# Patient Record
Sex: MALE | Race: BLACK OR AFRICAN AMERICAN | NOT HISPANIC OR LATINO | Employment: UNEMPLOYED | URBAN - METROPOLITAN AREA
[De-identification: names, ages, dates, MRNs, and addresses within clinical notes are randomized per-mention and may not be internally consistent; named-entity substitution may affect disease eponyms.]

---

## 2024-01-25 ENCOUNTER — OFFICE VISIT (OUTPATIENT)
Dept: INTERNAL MEDICINE | Facility: CLINIC | Age: 64
End: 2024-01-25
Payer: MEDICAID

## 2024-01-25 VITALS
SYSTOLIC BLOOD PRESSURE: 124 MMHG | HEART RATE: 76 BPM | OXYGEN SATURATION: 99 % | WEIGHT: 158.75 LBS | BODY MASS INDEX: 20.37 KG/M2 | HEIGHT: 74 IN | DIASTOLIC BLOOD PRESSURE: 80 MMHG

## 2024-01-25 DIAGNOSIS — C78.7 COLON CANCER METASTASIZED TO LIVER: ICD-10-CM

## 2024-01-25 DIAGNOSIS — I10 HYPERTENSION, UNSPECIFIED TYPE: ICD-10-CM

## 2024-01-25 DIAGNOSIS — Z00.00 ENCOUNTER FOR ANNUAL PHYSICAL EXAM: ICD-10-CM

## 2024-01-25 DIAGNOSIS — C18.9 COLON CANCER METASTASIZED TO LIVER: ICD-10-CM

## 2024-01-25 DIAGNOSIS — R20.2 PARESTHESIA OF LOWER EXTREMITY: ICD-10-CM

## 2024-01-25 DIAGNOSIS — Z00.00 PREVENTATIVE HEALTH CARE: Primary | ICD-10-CM

## 2024-01-25 DIAGNOSIS — N52.9 ERECTILE DYSFUNCTION, UNSPECIFIED ERECTILE DYSFUNCTION TYPE: ICD-10-CM

## 2024-01-25 PROCEDURE — 3008F BODY MASS INDEX DOCD: CPT | Mod: CPTII,,,

## 2024-01-25 PROCEDURE — 1101F PT FALLS ASSESS-DOCD LE1/YR: CPT | Mod: CPTII,,,

## 2024-01-25 PROCEDURE — 99999 PR PBB SHADOW E&M-NEW PATIENT-LVL IV: CPT | Mod: PBBFAC,,,

## 2024-01-25 PROCEDURE — 1126F AMNT PAIN NOTED NONE PRSNT: CPT | Mod: CPTII,,,

## 2024-01-25 PROCEDURE — 3074F SYST BP LT 130 MM HG: CPT | Mod: CPTII,,,

## 2024-01-25 PROCEDURE — 99204 OFFICE O/P NEW MOD 45 MIN: CPT | Mod: S$PBB,,,

## 2024-01-25 PROCEDURE — 99204 OFFICE O/P NEW MOD 45 MIN: CPT | Mod: PBBFAC

## 2024-01-25 PROCEDURE — 3288F FALL RISK ASSESSMENT DOCD: CPT | Mod: CPTII,,,

## 2024-01-25 PROCEDURE — 3079F DIAST BP 80-89 MM HG: CPT | Mod: CPTII,,,

## 2024-01-25 NOTE — PROGRESS NOTES
"[facility]  CLINIC NOTE    Patient Name: Rishi Matias  YOB: 1960  2024    Subjective:      Chief Complaint: Annual exam  HPI: The patient is a 63 y.o. male with no documented medical history being seen to establish care and for his annual exam.    Previous Oncologist: Dr. Dany Parham in [place], [place].  The patient has a history of stage IV colon cancer which metastasized to his liver and rectum. He underwent chemotherapy and surgery around 2019. He currently has an ostomy. He has not received chemotherapy in a few years. He states that he was lost to follow up and thinks that maybe he was supposed to receive more chemotherapy. Patient went to the [facility] but was told he needs a PCP referral.    The patient reports that since his ostomy surgery he has not had an erection. He denies morning erections. No dysuria or difficulty urinating.    He also reports decreased sensation in his lower extremities since his surgery. He states he has falls a few times per month when his legs "give out" on him. He denies light-headedness, dizziness, or palpitations.    Current smokin-6 cigarettes per day; smoked for 50 years. Patient is not interested in quitting smoking at this time.  Alcohol: None.    HEALTH MAINTENANCE:  Cholesterol/labs, HIV, Hep C: Will Check today  Colon CA screening: Patient with colon cancer, re-referring to oncology.  AAA screening: Will schedule  Lung CA screening: Will schedule    VACCINATIONS: Declining vaccinations at this time.    Review of Systems   Constitutional:  Negative for chills, fever and weight loss.   HENT:  Negative for congestion, hearing loss, sinus pain and sore throat.    Eyes:  Negative for blurred vision and redness.   Respiratory:  Negative for cough, sputum production, shortness of breath and wheezing.    Cardiovascular:  Negative for chest pain, palpitations and leg swelling.   Gastrointestinal:  Negative for abdominal pain, " "constipation, diarrhea, nausea and vomiting.   Genitourinary:  Negative for dysuria and urgency.   Musculoskeletal:  Negative for joint pain and myalgias.   Skin:  Negative for itching and rash.   Neurological:  Negative for sensory change, weakness and headaches.       There is no problem list on file for this patient.        Objective:      /80   Pulse 76   Ht 6' 2" (1.88 m)   Wt 72 kg (158 lb 11.7 oz)   SpO2 99%   BMI 20.38 kg/m²   Estimated body mass index is 20.38 kg/m² as calculated from the following:    Height as of this encounter: 6' 2" (1.88 m).    Weight as of this encounter: 72 kg (158 lb 11.7 oz).  Physical Exam  Constitutional:       General: He is not in acute distress.     Appearance: Normal appearance. He is not ill-appearing.   HENT:      Head: Normocephalic and atraumatic.      Mouth/Throat:      Mouth: Mucous membranes are moist.      Pharynx: Oropharynx is clear.   Eyes:      General: No scleral icterus.     Extraocular Movements: Extraocular movements intact.      Conjunctiva/sclera: Conjunctivae normal.   Cardiovascular:      Rate and Rhythm: Normal rate and regular rhythm.      Heart sounds: No murmur heard.  Pulmonary:      Effort: Pulmonary effort is normal. No respiratory distress.      Breath sounds: Normal breath sounds. No wheezing or rales.   Abdominal:      General: Abdomen is flat. Bowel sounds are normal. There is no distension.      Tenderness: There is no abdominal tenderness. There is no guarding.   Musculoskeletal:      Right lower leg: No edema.      Left lower leg: No edema.   Skin:     General: Skin is warm.      Coloration: Skin is not jaundiced.   Neurological:      Mental Status: He is alert and oriented to person, place, and time.      Sensory: Sensory deficit present.      Motor: Weakness (4/5 bilateral dorsiflexion of feet; otherwise strength intact) present.      Comments: Absent pinprick to bilateral lower extremities to knee   Psychiatric:         Mood and " Affect: Mood normal.         Assessment and Plan:     Preventative health care  Encounter for annual physical exam  63 year old male with history of hypertension and metastatic colon cancer. BP well controlled in office today at 124/80 without any antihypertensives. Will see back in 1 month for recheck to ensure BP well managed off of medications. Instructed patient to monitor BP at home as well and keep a log. Will get patient established with oncology in [place]. Unsure if patient completed his previously planned treatment or if he was lost to follow-up. Additionally, will obtain routine annual exam labs. Will check one time HIV and Hep C. Will check one time AAA screening given smoking history. Will schedule yearly low dose lung screening CT scan.    -     CBC W/ AUTO DIFFERENTIAL; Future; Expected date: 01/25/2024  -     COMPREHENSIVE METABOLIC PANEL; Future; Expected date: 01/25/2024  -     HEMOGLOBIN A1C; Future; Expected date: 01/25/2024  -     LIPID PANEL; Future; Expected date: 01/25/2024  -     Hepatitis C antibody; Future; Expected date: 01/25/2024  -     HIV 1/2 Ag/Ab (4th Gen); Future; Expected date: 01/25/2024  -     CV AAA Screening; Future  -     CT Chest Lung Screening Low Dose; Future; Expected date: 02/25/2024    Paresthesia of lower extremity  Patient reporting decreased sensation of bilateral lower extremities since his colostomy surgery. Suspect secondary to surgical complication. Will screen for diabetic neuropathy with A1c. Will refer to neurology for further management.    -     Ambulatory referral/consult to Neurology; Future; Expected date: 02/01/2024    Colon cancer metastasized to liver  History of stage IV colon cancer with mets to liver and rectum. Patient reports he received chemotherapy and surgery, no radiation. He suspects that he was supposed to receive more chemotherapy but was lost to follow-up. Will establish with oncology in [place].    -     Ambulatory  referral/consult to Oncology; Future; Expected date: 02/01/2024    Erectile dysfunction, unspecified erectile dysfunction type  Patient reports no erections since his colectomy. He suspects there was a complication at that time which led to his lower extremity numbness and his lack of erections. He does not have morning erections anymore. Suspect neurologic vs vascular issue rather than psychologic given lack of morning erections. Will refer to urology for further evaluation and management options.    -     Ambulatory referral/consult to Urology; Future; Expected date: 02/01/2024    Hypertension, unspecified type  BP well controlled in office despite no antihypertensives. Patient to check at home. Will see patient back in 1 month for BP check. Patient was previously on metoprolol and lisinopril.     - Home BP log   - RTC 1 month for BP check         Follow Up:   Follow up in about 4 weeks (around 2/22/2024) for BP check; follow-up.     Antolin Shell MD  Internal Medicine PGY-2  [facility]  [address] [place] [address]

## 2024-02-02 ENCOUNTER — TELEPHONE (OUTPATIENT)
Dept: HEMATOLOGY/ONCOLOGY | Facility: CLINIC | Age: 64
End: 2024-02-02
Payer: MEDICAID

## 2024-02-02 NOTE — TELEPHONE ENCOUNTER
Referral received for hem/onc. Attempted to call & discuss. Pt has [place] Medicaid which is not in network with [facility]. No answer, left voicemail.